# Patient Record
(demographics unavailable — no encounter records)

---

## 2024-11-22 NOTE — HISTORY OF PRESENT ILLNESS
Encounter addended by: Joanne Montiel RN on: 6/4/2024 4:42 PM   Actions taken: Charge Capture section accepted [de-identified] : cough [FreeTextEntry6] : cough past 24 hours no fever motrin given

## 2024-11-22 NOTE — DISCUSSION/SUMMARY
[FreeTextEntry1] : likely RSV PCR sent no need for nebs f/u if sx worsen This illness is caused by a virus and symptoms may last 7-10 days. Recommend bulb suctions with saline nose drops at home especially before feeding and sleep. Give tylenol if needed for fever or pain. Encourage fluids, prop them up when sleeping, run a cool mist vaporizer at home. Follow up if there is any worsening or difficulty breathing )breathing faster than 50-60 times in one minute or retractions-sucking in the belly just under the ribs with each breath), vomiting.not feeling well or other concerns.

## 2024-11-22 NOTE — PHYSICAL EXAM
[Transmitted Upper Airway Sounds] : transmitted upper airway sounds [NL] : warm, clear [Crackles] : no crackles [FreeTextEntry7] : bronchospastic cough mucous, rhonchi , clears when coughs

## 2025-06-23 NOTE — DEVELOPMENTAL MILESTONES
[Normal Development] : Normal Development [None] : none [Help dress and undress self] : help dress and undress self [Points to object of interest to] : points to object of interest to draw attention to it [Turns and looks at adult if] : turns and looks at adult if something new happens [Begins to scoop with spoon] : begins to scoop with spoon [Uses 6 to 10 words other than] : uses 6 to 10 words other than names [Identifies at least 2 body parts] : identifies at least 2 body parts [Walks up with 2 feet per step] : walks up with 2 feet per step with hand held [Sits in small chair] : sits in small chair [Scribbles spontaneously] : scribbles spontaneously [Throws small ball a few feet] : throws a small ball a few feet while standing [Passed] : passed [Yes] : Completed.

## 2025-06-23 NOTE — PHYSICAL EXAM
[Alert] : alert [No Acute Distress] : no acute distress [Normocephalic] : normocephalic [Anterior [place] Closed] : anterior fontanelle closed [Red Reflex Bilateral] : red reflex bilateral [PERRL] : PERRL [Normally Placed Ears] : normally placed ears [Auricles Well Formed] : auricles well formed [Clear Tympanic membranes with present light reflex and bony landmarks] : clear tympanic membranes with present light reflex and bony landmarks [No Discharge] : no discharge [Nares Patent] : nares patent [Palate Intact] : palate intact [Uvula Midline] : uvula midline [Tooth Eruption] : tooth eruption  [Supple, full passive range of motion] : supple, full passive range of motion [No Palpable Masses] : no palpable masses [Symmetric Chest Rise] : symmetric chest rise [Clear to Auscultation Bilaterally] : clear to auscultation bilaterally [Regular Rate and Rhythm] : regular rate and rhythm [S1, S2 present] : S1, S2 present [No Murmurs] : no murmurs [+2 Femoral Pulses] : +2 femoral pulses [Soft] : soft [NonTender] : non tender [Non Distended] : non distended [Normoactive Bowel Sounds] : normoactive bowel sounds [No Hepatomegaly] : no hepatomegaly [No Splenomegaly] : no splenomegaly [Central Urethral Opening] : central urethral opening [Testicles Descended Bilaterally] : testicles descended bilaterally [Patent] : patent [Normally Placed] : normally placed [No Abnormal Lymph Nodes Palpated] : no abnormal lymph nodes palpated [No Clavicular Crepitus] : no clavicular crepitus [Symmetric Buttocks Creases] : symmetric buttocks creases [No Spinal Dimple] : no spinal dimple [NoTuft of Hair] : no tuft of hair [Cranial Nerves Grossly Intact] : cranial nerves grossly intact [No Rash or Lesions] : no rash or lesions

## 2025-06-23 NOTE — DISCUSSION/SUMMARY
[Normal Growth] : growth [Normal Development] : development [None] : No known medical problems [No Elimination Concerns] : elimination [No Feeding Concerns] : feeding [No Skin Concerns] : skin [Normal Sleep Pattern] : sleep [Communication and Social Development] : communication and social development [Sleep Routines and Issues] : sleep routines and issues [Temper Tantrums and Discipline] : temper tantrums and discipline [Healthy Teeth] : healthy teeth [Safety] : safety [No Medications] : ~He/She~ is not on any medications [Parent/Guardian] : parent/guardian [] : The components of the vaccine(s) to be administered today are listed in the plan of care. The disease(s) for which the vaccine(s) are intended to prevent and the risks have been discussed with the caretaker.  The risks are also included in the appropriate vaccination information statements which have been provided to the patient's caregiver.  The caregiver has given consent to vaccinate. [FreeTextEntry1] : Discussed safety/feeding/sleep as appropriate for age.  Time allowed for questions and all answered with understanding.  MCHAT screening tool was reviewed and discussed with parent. There are no concerns of PDD or autism spectrum.  Oral Health Risk Assessment Tool used and reviewed. Child is deemed at low risk.  NEEDS VACCINES: MMR HEP A PVC 20 PENTACEL MOM DECLINING ALL VACCINES TODAY EXCEPT PCV 20 OVERDUE MMR DECLINED TODAY AS WELL DESPITE KNOWN RISKS  In addition to routine guidance, children at this age should be off pacifiers and bottles, attention to toilet training readiness should be given and the potty should be introduced. As a transition, the child may be encouraged to sit on the potty with the diaper on at first to ease with anxiety with bowel movements. BM training normally precede voiding training. As a matter of safety, the consideration to transferring to a regular bed should be made prior to child climbing out.  Recommended yearly flu vaccine, discussed benefits of vaccine, side effects of the vaccine, and risks of not receiving the vaccine including the increased risk of annika the illness, increased risks of secondary infections, hospitalizations, and even death.  RTO FOR VACCINES MISSED ASAP URGENCY EXPLAINED MOM UNDERSTANDS  NEXT WELL AT 24 MOS

## 2025-06-23 NOTE — HISTORY OF PRESENT ILLNESS
[Mother] : mother [Cow's milk (Ounces per day ___)] : consumes [unfilled] oz of Cow's milk per day [Normal] : Normal [Vitamin] : Primary Fluoride Source: Vitamin [Playtime] : Playtime  [Water heater temperature set at <120 degrees F] : Water heater temperature set at <120 degrees F [Car seat in back seat] : Car seat in back seat [Carbon Monoxide Detectors] : Carbon monoxide detectors [Smoke Detectors] : Smoke detectors [Influenza] : Influenza [Hepatitis A] : Hepatitis A [MMR] : MMR [Pacifier use] : Pacifier use [No] : Patient does not go to dentist yearly [de-identified] : refusing fluoride [FreeTextEntry1] : MISSED 15 MOS AND 18 MOS NEEDS SCREENS NEES VACCINES: MMR HEP A PVC 20 PENTACEL